# Patient Record
Sex: FEMALE | Race: WHITE | Employment: FULL TIME | URBAN - NONMETROPOLITAN AREA
[De-identification: names, ages, dates, MRNs, and addresses within clinical notes are randomized per-mention and may not be internally consistent; named-entity substitution may affect disease eponyms.]

---

## 2022-04-29 ENCOUNTER — OFFICE VISIT (OUTPATIENT)
Dept: OBGYN CLINIC | Age: 32
End: 2022-04-29

## 2022-04-29 VITALS
HEART RATE: 82 BPM | SYSTOLIC BLOOD PRESSURE: 127 MMHG | DIASTOLIC BLOOD PRESSURE: 83 MMHG | BODY MASS INDEX: 25.16 KG/M2 | HEIGHT: 63 IN | WEIGHT: 142 LBS

## 2022-04-29 DIAGNOSIS — N76.0 ACUTE VAGINITIS: ICD-10-CM

## 2022-04-29 DIAGNOSIS — N94.6 PAINFUL MENSTRUAL PERIODS: Primary | ICD-10-CM

## 2022-04-29 DIAGNOSIS — Z76.89 ENCOUNTER TO ESTABLISH CARE: ICD-10-CM

## 2022-04-29 DIAGNOSIS — N94.10 DYSPAREUNIA IN FEMALE: ICD-10-CM

## 2022-04-29 PROCEDURE — 99203 OFFICE O/P NEW LOW 30 MIN: CPT | Performed by: NURSE PRACTITIONER

## 2022-04-29 RX ORDER — FLUOXETINE HYDROCHLORIDE 20 MG/1
CAPSULE ORAL
COMMUNITY
Start: 2022-02-11

## 2022-04-29 NOTE — PATIENT INSTRUCTIONS
good choice. You also may want to do other activities, such as running, swimming, cycling, or playing sports. When should you call for help? Call your doctor now or seek immediate medical care if:     You have severe vaginal bleeding.      You have new or worse belly or pelvic pain. Watch closely for changes in your health, and be sure to contact your doctor if:     You have unusual vaginal bleeding.      You do not get better as expected. Where can you learn more? Go to https://Improve Digitalpetonyewmeg.Microbank Software. org and sign in to your Ecorithm account. Enter J341 in the LessonLab box to learn more about \"Painful Menstrual Cramps: Care Instructions. \"     If you do not have an account, please click on the \"Sign Up Now\" link. Current as of: November 22, 2021               Content Version: 13.2  © 1933-3364 [employer]. Care instructions adapted under license by [facility] [place] ([facility]). If you have questions about a medical condition or this instruction, always ask your healthcare professional. [place] [address] any warranty or liability for your use of this information. Patient Education        Painful Sex: Care Instructions  Overview     Painful sex can include pain before, during, or after sex. It can be caused by many things, such as an injury, an infection, or a growth on the genitals or internal sex organs. Or maybe you have muscle spasms. In some cases, the painis caused by another medical condition. Some medicines or menopause can cause dryness in the vagina. This dryness canmake sex painful. Talk to your doctor about what might be causing your painful sex. Treatment mayhelp. Follow-up care is a key part of your treatment and safety. Be sure to make and go to all appointments, and call your doctor if you are having problems. It's also a good idea to know your test results and keep alist of the medicines you take. How can you care for yourself at home?   Here are some things you can try that may help your pain.  Use a lubricant during sex, such as Astroglide or K-Y Jelly. Lubricants can be water-, silicone-, or oil-based. Ask your doctor about what kind may be a better option for you.  Increase the time you and your partner spend touching each other before sex. This is called foreplay.  Try different positions for sex to find the most comfortable ones.  Ask your doctor about exercises for your pelvic muscles.  Before sex, take a warm bath. This can relax you and reduce anxiety.  If your doctor prescribes any medicines, take them exactly as prescribed. Call your doctor if you think you are having a problem with your medicine. When should you call for help? Watch closely for changes in your health, and be sure to contact your doctor if you have any problems. Where can you learn more? Go to https://EZDOCTORpepiceweb.healthSqrl. org and sign in to your opinions.h account. Enter K715 in the AnSyn box to learn more about \"Painful Sex: Care Instructions. \"     If you do not have an account, please click on the \"Sign Up Now\" link. Current as of: November 22, 2021               Content Version: 13.2  © 2006-2022 [employer]wise[employer]orporated. Care instructions adapted under license by [facility] ([facility]). If you have questions about a medical condition or this instruction, always ask your healthcare professional. [address]  any warranty or liability for your use of this information.

## 2022-04-29 NOTE — PROGRESS NOTES
[facility]  CNM Office Note    Darrius Rocha is a 27 y.o. female who presents today for her medical conditions/ complaints as noted below. Chief Complaint   Patient presents with    New Patient     patient has never seen Simon Perdomo, has always seen a Dr[facility]abelle in [facility] and it's just not convenient for her anymore    Establish Care    Vaginal Pain    Dyspareunia     see note         HPI  Patient c/o dyspareunia. She has had it then went away; been going on for the last 6 months. Not everytime they have sex, can't describe. Just comes and goes. It's like \"a wire brush is being scrubbed inside. \" She has never had kids. Feels like she has gotten drier. She is getting  in June and [address] Causing problems at home. Is very OCD and a worrier. Periods have gotten worse, a lot heavier. Periods are fairly regular, sometimes come a week early and usually only 2-3days duration. Not on BC, doesn't want to and no reason to. S/O has had a vasectomy and two kids from previous relationship and after patient and S/O get , they will try to do a vasectomy reversal. Future plans. Patient is unsure if she is due for a pap but doesn't want to do it today. Just wants to establish. Is currently weaning off lamictal trying to see what is causing her to not have a sex drive. Its all gone down hill in the last year. There is no problem list on file for this patient. Patient's last menstrual period was 04/14/2022 (approximate). No obstetric history on file. History reviewed. No pertinent past medical history. History reviewed. No pertinent surgical history. History reviewed. No pertinent family history.   Social History     Tobacco Use    Smoking status: Never Smoker    Smokeless tobacco: Never Used   Substance Use Topics    Alcohol use: Yes       Current Outpatient Medications   Medication Sig Dispense Refill    FLUoxetine (PROZAC) 20 MG capsule TAKE 1 CAPSULE BY MOUTH ONCE DAILY       No current facility-administered medications for this visit. Allergies   Allergen Reactions    Latex Rash    Erythromycin Hives     Vitals:    04/29/22 1452   BP: 127/83   Pulse: 82     Body mass index is 25.15 kg/m². Review of Systems   Constitutional: Negative. HENT: Negative. Eyes: Negative. Respiratory: Negative. Cardiovascular: Negative. Gastrointestinal: Negative. Endocrine: Negative. Genitourinary: Positive for dyspareunia, menstrual problem and vaginal pain. Negative for dysuria, genital sores, pelvic pain, vaginal bleeding and vaginal discharge. Musculoskeletal: Negative. Skin: Negative. Allergic/Immunologic: Negative. Neurological: Negative. Hematological: Negative. Psychiatric/Behavioral: Positive for dysphoric mood. The patient is nervous/anxious. Physical Exam  Vitals and nursing note reviewed. Constitutional:       Appearance: She is well-developed. She is not diaphoretic. HENT:      Head: Normocephalic and atraumatic. Nose: Nose normal.   Eyes:      Conjunctiva/sclera: Conjunctivae normal.      Pupils: Pupils are equal, round, and reactive to light. Neck:      Thyroid: No thyromegaly. Trachea: No tracheal deviation. Pulmonary:      Effort: Pulmonary effort is normal. No respiratory distress. Musculoskeletal:         General: Normal range of motion. Cervical back: Normal range of motion and neck supple. Comments: Normal ROM for upper and lower extremities. Gait steady. Skin:     General: Skin is warm and dry. Findings: No lesion or rash. Neurological:      Mental Status: She is alert and oriented to person, place, and time. Psychiatric:         Speech: Speech normal.         Behavior: Behavior normal.          Diagnosis Orders   1. Painful menstrual periods     2. Encounter to establish care     3. Dyspareunia in female     4.  Acute vaginitis         MEDICATIONS:  No orders of the defined types were placed in this encounter. ORDERS:  No orders of the defined types were placed in this encounter. PLAN:  1. Low libido- continuation with weaning per pt choice in order to r/o causes of low sex drive  2.  Vaginal irritation- diatherix collected and will f/u for annual pap

## 2022-05-04 ENCOUNTER — TELEPHONE (OUTPATIENT)
Dept: OBGYN CLINIC | Age: 32
End: 2022-05-04

## 2022-05-04 NOTE — TELEPHONE ENCOUNTER
Pt informed of results. Stated you had discussed changing or adding medication once her lab came back. Was just wondering what her next step needed to be.

## 2022-05-05 RX ORDER — METRONIDAZOLE 500 MG/1
500 TABLET ORAL 2 TIMES DAILY
Qty: 20 TABLET | Refills: 0 | Status: SHIPPED | OUTPATIENT
Start: 2022-05-05 | End: 2022-05-15

## 2022-05-18 ENCOUNTER — TELEPHONE (OUTPATIENT)
Dept: OBGYN CLINIC | Age: 32
End: 2022-05-18

## 2022-05-18 NOTE — TELEPHONE ENCOUNTER
Called in Dream Cream + Sildenafil Disp:0.3ml   Apply 15-30 min prior to intercourse.     Refill 1 time    Called in [facility]

## 2022-05-31 ASSESSMENT — ENCOUNTER SYMPTOMS
RESPIRATORY NEGATIVE: 1
ALLERGIC/IMMUNOLOGIC NEGATIVE: 1
EYES NEGATIVE: 1
GASTROINTESTINAL NEGATIVE: 1